# Patient Record
Sex: MALE | Race: WHITE | Employment: OTHER | URBAN - METROPOLITAN AREA
[De-identification: names, ages, dates, MRNs, and addresses within clinical notes are randomized per-mention and may not be internally consistent; named-entity substitution may affect disease eponyms.]

---

## 2021-04-01 ENCOUNTER — APPOINTMENT (OUTPATIENT)
Dept: CT IMAGING | Facility: CLINIC | Age: 76
End: 2021-04-01
Attending: PHYSICIAN ASSISTANT
Payer: MEDICARE

## 2021-04-01 ENCOUNTER — HOSPITAL ENCOUNTER (EMERGENCY)
Facility: CLINIC | Age: 76
Discharge: HOME | End: 2021-04-01
Attending: PHYSICIAN ASSISTANT | Admitting: PHYSICIAN ASSISTANT
Payer: MEDICARE

## 2021-04-01 VITALS
OXYGEN SATURATION: 95 % | RESPIRATION RATE: 18 BRPM | HEART RATE: 100 BPM | BODY MASS INDEX: 36.57 KG/M2 | TEMPERATURE: 98.1 F | HEIGHT: 72 IN | SYSTOLIC BLOOD PRESSURE: 142 MMHG | WEIGHT: 270 LBS | DIASTOLIC BLOOD PRESSURE: 99 MMHG

## 2021-04-01 DIAGNOSIS — R42 LIGHTHEADEDNESS: ICD-10-CM

## 2021-04-01 DIAGNOSIS — R79.89 ELEVATED SERUM CREATININE: ICD-10-CM

## 2021-04-01 LAB
ALBUMIN SERPL-MCNC: 4 G/DL (ref 3.4–5)
ALBUMIN UR-MCNC: 30 MG/DL
ALP SERPL-CCNC: 100 U/L (ref 40–150)
ALT SERPL W P-5'-P-CCNC: 28 U/L (ref 0–70)
ANION GAP SERPL CALCULATED.3IONS-SCNC: 7 MMOL/L (ref 3–14)
APPEARANCE UR: CLEAR
AST SERPL W P-5'-P-CCNC: 33 U/L (ref 0–45)
BASOPHILS # BLD AUTO: 0.1 10E9/L (ref 0–0.2)
BASOPHILS NFR BLD AUTO: 0.6 %
BILIRUB SERPL-MCNC: 1.1 MG/DL (ref 0.2–1.3)
BILIRUB UR QL STRIP: NEGATIVE
BUN SERPL-MCNC: 46 MG/DL (ref 7–30)
CALCIUM SERPL-MCNC: 9.3 MG/DL (ref 8.5–10.1)
CHLORIDE SERPL-SCNC: 112 MMOL/L (ref 94–109)
CO2 SERPL-SCNC: 22 MMOL/L (ref 20–32)
COLOR UR AUTO: YELLOW
CREAT SERPL-MCNC: 2.03 MG/DL (ref 0.66–1.25)
DIFFERENTIAL METHOD BLD: ABNORMAL
EOSINOPHIL # BLD AUTO: 0.1 10E9/L (ref 0–0.7)
EOSINOPHIL NFR BLD AUTO: 1.3 %
ERYTHROCYTE [DISTWIDTH] IN BLOOD BY AUTOMATED COUNT: 13.9 % (ref 10–15)
GFR SERPL CREATININE-BSD FRML MDRD: 31 ML/MIN/{1.73_M2}
GLUCOSE SERPL-MCNC: 89 MG/DL (ref 70–99)
GLUCOSE UR STRIP-MCNC: NEGATIVE MG/DL
HCT VFR BLD AUTO: 49.4 % (ref 40–53)
HGB BLD-MCNC: 16 G/DL (ref 13.3–17.7)
HGB UR QL STRIP: NEGATIVE
HYALINE CASTS #/AREA URNS LPF: 3 /LPF (ref 0–2)
IMM GRANULOCYTES # BLD: 0.1 10E9/L (ref 0–0.4)
IMM GRANULOCYTES NFR BLD: 0.8 %
INTERPRETATION ECG - MUSE: NORMAL
KETONES UR STRIP-MCNC: 5 MG/DL
LABORATORY COMMENT REPORT: NORMAL
LACTATE BLD-SCNC: 1.1 MMOL/L (ref 0.7–2)
LACTATE BLD-SCNC: 2.2 MMOL/L (ref 0.7–2)
LEUKOCYTE ESTERASE UR QL STRIP: NEGATIVE
LYMPHOCYTES # BLD AUTO: 0.6 10E9/L (ref 0.8–5.3)
LYMPHOCYTES NFR BLD AUTO: 6.5 %
MCH RBC QN AUTO: 30.7 PG (ref 26.5–33)
MCHC RBC AUTO-ENTMCNC: 32.4 G/DL (ref 31.5–36.5)
MCV RBC AUTO: 95 FL (ref 78–100)
MONOCYTES # BLD AUTO: 0.7 10E9/L (ref 0–1.3)
MONOCYTES NFR BLD AUTO: 6.9 %
MUCOUS THREADS #/AREA URNS LPF: PRESENT /LPF
NEUTROPHILS # BLD AUTO: 8.2 10E9/L (ref 1.6–8.3)
NEUTROPHILS NFR BLD AUTO: 83.9 %
NITRATE UR QL: NEGATIVE
NRBC # BLD AUTO: 0 10*3/UL
NRBC BLD AUTO-RTO: 0 /100
NT-PROBNP SERPL-MCNC: 322 PG/ML (ref 0–900)
PH UR STRIP: 5.5 PH (ref 5–7)
PLATELET # BLD AUTO: 186 10E9/L (ref 150–450)
POTASSIUM SERPL-SCNC: 4.7 MMOL/L (ref 3.4–5.3)
PROT SERPL-MCNC: 8.1 G/DL (ref 6.8–8.8)
RBC # BLD AUTO: 5.22 10E12/L (ref 4.4–5.9)
RBC #/AREA URNS AUTO: 1 /HPF (ref 0–2)
SARS-COV-2 RNA RESP QL NAA+PROBE: NEGATIVE
SODIUM SERPL-SCNC: 141 MMOL/L (ref 133–144)
SOURCE: ABNORMAL
SP GR UR STRIP: 1.02 (ref 1–1.03)
SPECIMEN SOURCE: NORMAL
SQUAMOUS #/AREA URNS AUTO: 1 /HPF (ref 0–1)
TROPONIN I SERPL-MCNC: <0.015 UG/L (ref 0–0.04)
TSH SERPL DL<=0.005 MIU/L-ACNC: 0.62 MU/L (ref 0.4–4)
UROBILINOGEN UR STRIP-MCNC: 0 MG/DL (ref 0–2)
WBC # BLD AUTO: 9.7 10E9/L (ref 4–11)
WBC #/AREA URNS AUTO: 1 /HPF (ref 0–5)

## 2021-04-01 PROCEDURE — 83880 ASSAY OF NATRIURETIC PEPTIDE: CPT | Performed by: PHYSICIAN ASSISTANT

## 2021-04-01 PROCEDURE — 80053 COMPREHEN METABOLIC PANEL: CPT | Performed by: PHYSICIAN ASSISTANT

## 2021-04-01 PROCEDURE — 83605 ASSAY OF LACTIC ACID: CPT | Performed by: PHYSICIAN ASSISTANT

## 2021-04-01 PROCEDURE — 84443 ASSAY THYROID STIM HORMONE: CPT | Performed by: PHYSICIAN ASSISTANT

## 2021-04-01 PROCEDURE — 51798 US URINE CAPACITY MEASURE: CPT

## 2021-04-01 PROCEDURE — 87635 SARS-COV-2 COVID-19 AMP PRB: CPT | Performed by: PHYSICIAN ASSISTANT

## 2021-04-01 PROCEDURE — 81001 URINALYSIS AUTO W/SCOPE: CPT | Performed by: PHYSICIAN ASSISTANT

## 2021-04-01 PROCEDURE — 70450 CT HEAD/BRAIN W/O DYE: CPT

## 2021-04-01 PROCEDURE — 85025 COMPLETE CBC W/AUTO DIFF WBC: CPT | Performed by: PHYSICIAN ASSISTANT

## 2021-04-01 PROCEDURE — C9803 HOPD COVID-19 SPEC COLLECT: HCPCS

## 2021-04-01 PROCEDURE — 93005 ELECTROCARDIOGRAM TRACING: CPT

## 2021-04-01 PROCEDURE — 96360 HYDRATION IV INFUSION INIT: CPT

## 2021-04-01 PROCEDURE — 84484 ASSAY OF TROPONIN QUANT: CPT | Performed by: PHYSICIAN ASSISTANT

## 2021-04-01 PROCEDURE — 99285 EMERGENCY DEPT VISIT HI MDM: CPT | Mod: 25

## 2021-04-01 PROCEDURE — 258N000003 HC RX IP 258 OP 636: Performed by: PHYSICIAN ASSISTANT

## 2021-04-01 RX ORDER — LISINOPRIL 40 MG/1
40 TABLET ORAL DAILY
COMMUNITY

## 2021-04-01 RX ORDER — SIMVASTATIN 40 MG
40 TABLET ORAL AT BEDTIME
COMMUNITY

## 2021-04-01 RX ORDER — AMLODIPINE BESYLATE 10 MG/1
10 TABLET ORAL DAILY
COMMUNITY

## 2021-04-01 RX ORDER — AMIODARONE HYDROCHLORIDE 200 MG/1
200 TABLET ORAL DAILY
COMMUNITY

## 2021-04-01 RX ORDER — CARBIDOPA/LEVODOPA 25MG-250MG
1 TABLET ORAL 3 TIMES DAILY
COMMUNITY

## 2021-04-01 RX ADMIN — SODIUM CHLORIDE 500 ML: 9 INJECTION, SOLUTION INTRAVENOUS at 11:59

## 2021-04-01 ASSESSMENT — ENCOUNTER SYMPTOMS
FEVER: 0
HEADACHES: 0
NUMBNESS: 0
WEAKNESS: 0
SHORTNESS OF BREATH: 0
COUGH: 0
BACK PAIN: 0
NECK PAIN: 0
LIGHT-HEADEDNESS: 1
ARTHRALGIAS: 0

## 2021-04-01 ASSESSMENT — MIFFLIN-ST. JEOR: SCORE: 2007.71

## 2021-04-01 NOTE — ED TRIAGE NOTES
Pt. Came by train from [facility] when he got oiff of train he fell landing on hands and knees was helped up and he walked to taxi where he fell again going on hands and knees. Walked to airport and he fell again going to hands and knees and to lt. Side, again helped up by bystanders and he could not walk felt like hid knees were not working. All these falls accompanied w/ lightheadedness ,weakness and felt like his knees would not hold him back, Denies loc or hitting his head.

## 2021-04-01 NOTE — ED NOTES
Bed: ED29  Expected date:   Expected time:   Means of arrival:   Comments:  511  75 M syncope/no covid  1112

## 2021-04-01 NOTE — ED PROVIDER NOTES
History   Chief Complaint:  Lightheadedness and Fall     The history is provided by the patient, the EMS personnel and the spouse.      Umang Ace is a 75 year old male with history of atrial fibrillation on Eliquis, Parkinson's disease with DBS implanted, and hypertension who presents with lightheadedness and a fall. The patient took a train here from [place] this morning, upon standing up to exit the train he became acutely winded and lightheaded, then fell. He hit his knee on the way down, sustained a scrape to his right knee. He otherwise denies hitting his head, LOC, or any other injuries from the fall. He did require help standing up, but this is his baseline. He states that his legs felt weak, but was able to walk to the outside of the airport. When EMS arrived, he experienced another fall, however did not hit his head or lose consciousness this time either. At this time, his BP was 77 systolic. Upon lying down, his lightheadedness improved and his BP kareem to 117. En route he was vitally stable with a BG of 108.  He denies any associated chest pain, shortness of breath, fevers, cough, vision loss, one sided numbness/weakness, headaches, neck pain, or back pain. No abdominal or flank pain.  He has been urinating normally.  His wife does report that he has been falling more frequently due to his Parkinson's.  He also does note that he did not eat or drink much this morning and this may be contributing.    Review of Systems   Constitutional: Negative for fever.   Eyes: Negative for visual disturbance.   Respiratory: Negative for cough and shortness of breath.    Cardiovascular: Negative for chest pain.   Musculoskeletal: Negative for arthralgias, back pain and neck pain.   Neurological: Positive for light-headedness. Negative for syncope, weakness, numbness and headaches.   All other systems reviewed and are negative.    Allergies:  No Known Drug Allergies    Medications:  Eliquis  Recently stopped  amlodipine and sotolol.  Recently started amiodarone.  Simvastatin  Sinemet  Lisinopril     Past Medical History:    Parkinson's disease  Atrial fibrillation  Hypertension  LUTS  Denies history of CHF.     Past Surgical History:    DBS implanted    Social History:  The patient was accompanied to the ED by his wife.   Marital status:   Lives in [place].    Physical Exam     Patient Vitals for the past 24 hrs:   BP Temp Temp src Pulse Resp SpO2 Height Weight   04/01/21 1430 129/86 98  F (36.7  C) Oral 98 18 98 % -- --   04/01/21 1330 (!) 141/97 -- -- 100 18 97 % -- --   04/01/21 1300 (!) 152/104 -- -- 101 18 98 % -- --   04/01/21 1132 -- -- -- -- -- -- 1.829 m (6') 122.5 kg (270 lb)   04/01/21 1130 118/84 98.1  F (36.7  C) Oral 101 16 97 % -- --       Physical Exam  General: Awake, alert, pleasant, non-toxic.  Head:  Scalp is NC/AT  Eyes:  Conjunctiva normal, PERRL  ENT:  The external nose and ears are normal.   Neck:  Normal range of motion without rigidity.  CV:  Regular rate and rhythm    No pathologic murmur, rubs, or gallops.  Resp:  Breath sounds are clear bilaterally.  No crackles, wheezes, rhonchi, stridor.    Non-labored, no retractions or accessory muscle use  Abdomen: Abdomen is soft, no distension, no tenderness, no masses. No CVA tenderness.  MS:  No lower extremity edema or asymmetric calf swelling. Normal ROM in all joints without effusions.    No midline cervical, thoracic, or lumbar tenderness  Skin:  Warm and dry, No rash or lesions noted. 2+ peripheral pulses in all extremities  Neuro: Alert and oriented x3.5/5 strength BL in UE and LE, normal sensation to touch.  Cranial nerves 2-12 intact.  Normal finger to nose testing.  Gait normal  Psych: Awake. Alert. Normal affect. Appropriate interactions.    Emergency Department Course   ECG  ECG taken at 1137, ECG read at 1144  Sinus tachycardia with 1st degree AV block with occasional and consecutive premature ventricular complexes and fusion  complexes  Right atrial enlargement  Low voltage QRS  Nonspecific ST abnormality  Abnormal ECG  Marked  Baseline artifact from DBS  No prior for comparison.  Rate 101 bpm. MO interval 214 ms. QRS duration 76 ms. QT/QTc 342/443 ms. P-R-T axes 18 27 1.     Imaging:  CT head w/o contrast:  IMPRESSION:     1. No definite acute intracranial pathology by head CT.  2. Frontal approach deep brain stimulator wires are in place.  3. Nonspecific white matter changes likely due to chronic  microvascular ischemic disease.  Report per radiology     Laboratory:   CBC: WBC 9.7, HGB 16.0,    CMP: chloride 112 (H), BUN 46 (H), creatinine 2.03 (H), GFR estimate 31 (L) o/w WNL  TSH with free T4 reflex: 0.62  Troponin (Collected 1144): <0.015  BNP: 322    Lactic acid (Collected 1144): 2.2 (H)  Lactic acid (Collected 1504): 1.1     Asymptomatic COVID-19 virus by PCR: negative    UA with Microscopic: ketone 5, protein albumin 30, mucous present, hyaline casts 3 (H) o/w WNL    Emergency Department Course:    Reviewed:  1200 I reviewed nursing notes, vitals and care everywhere    Assessments:  1118 The patient arrived via EMS. I obtained history and examined the patient as noted above.   1236 I rechecked the patient.   1502 I rechecked the patient and explained findings.     Interventions:  1159  ML IV Bolus    Disposition:  The patient was discharged to home.     Impression & Plan     CMS Diagnoses: The Lactic acid level is elevated due to dehydration, at this time there is no sign of severe sepsis or septic shock. and None    Medical Decision Makin-year-old male with history of Parkinson's presents after generalized weakness and falls.  Broad differential considered.  EKG shows first-degree AV block with some PVCs and nonspecific changes though no evidence of acute ischemia or arrhythmia, prolonged QT, WPW, Brugada and patient did not pass out.  Troponin is undetectable and he has no chest pain or shortness of breath  and is therapeutically anticoagulated on Eliquis and I doubt ACS, PE, aortic dissection.  No arrhythmias detected on monitoring here and low suspicion for cardiac arrhythmia at this time.  No new murmur heard.  He has a normal neurologic exam with no focal deficits and ambulates well here in the ED and no signs of stroke, or acute spinal lesion.  A CT scan of his head is negative for bleed or other acute traumatic abnormality and a head to toe trauma exam is otherwise unremarkable.  Symptoms not consistent with seizures.  His initial lactate was mildly elevated though this normalized with small amount of fluid and afebrile with normal white blood cell count no evidence of sepsis.  Hemoglobin is at baseline.  His creatinine was somewhat elevated today to 2.04, and per review of his records his most recent baseline was 1.5 approximately 4 months ago.  Suspect likely prerenal due to mild dehydration and additionally may be some degree of progression of chronic kidney disease.  Urinalysis relatively unremarkable.  Bladder scan with less than 50 cc no evidence of urinary retention and no pain or other symptoms to suggest post renal obstructive cause.    Patient felt markedly improved following fluids and ambulated while here in the ED.  Suspect weakness and falls due to Parkinson's symptoms though given that he feels improved and is ambulating well here I think discharge is reasonable. Close follow-up with primary care provider for further evaluation and recheck of creatinine as well as with neurology to discuss his progression of symptoms.  He will return if he develops any new or worsening symptoms.    Covid-19  Umang Ace was evaluated during a global COVID-19 pandemic, which necessitated consideration that the patient might be at risk for infection with the SARS-CoV-2 virus that causes COVID-19.   Applicable protocols for evaluation were followed during the patient's care.   COVID-19 was considered as part of  the patient's evaluation. The plan for testing is:  a test was obtained during this visit.    Diagnosis:    ICD-10-CM    1. Lightheadedness  R42    2. Elevated serum creatinine  R79.89        Discharge Medications:  New Prescriptions    No medications on file       Scribe Disclosure:  I, Jeane Frank, am serving as a scribe at 11:23 AM on 4/1/2021 to document services personally performed by Jose Abbott PA-C based on my observations and the provider's statements to me.          Jose Abbott PA-C  04/01/21 174